# Patient Record
Sex: MALE | Race: WHITE
[De-identification: names, ages, dates, MRNs, and addresses within clinical notes are randomized per-mention and may not be internally consistent; named-entity substitution may affect disease eponyms.]

---

## 2018-10-23 ENCOUNTER — HOSPITAL ENCOUNTER (EMERGENCY)
Dept: HOSPITAL 31 - C.ER | Age: 43
Discharge: HOME | End: 2018-10-23
Payer: COMMERCIAL

## 2018-10-23 VITALS — OXYGEN SATURATION: 99 %

## 2018-10-23 VITALS — TEMPERATURE: 98.5 F | SYSTOLIC BLOOD PRESSURE: 153 MMHG | DIASTOLIC BLOOD PRESSURE: 89 MMHG

## 2018-10-23 VITALS — HEART RATE: 88 BPM | RESPIRATION RATE: 18 BRPM

## 2018-10-23 DIAGNOSIS — J06.9: Primary | ICD-10-CM

## 2018-10-23 NOTE — C.PDOC
History Of Present Illness


42 year old male presents to the ER with a complaint of a cough productive of 

clear sputum for one week associated with subjective fever and itchy throat. 

Patient took OTC cough and congestion medication with no relief. Patient states 

he has been unable to seen his PMD as they are on vacation, prompting visit. 

Denies SOB or chest tightness.


Time Seen by Provider: 10/23/18 02:43


Chief Complaint (Nursing): Cough, Cold, Congestion


History Per: Patient


History/Exam Limitations: no limitations


Onset/Duration Of Symptoms: Days


Current Symptoms Are (Timing): Still Present


Sick Contacts (Context): None


Associated Symptoms: Fever (subjective), Cough, Sputum (clear).  denies: Other 

(Itchy throat. No SOB or chest tightness)


Ear Symptoms: Bilateral: None


Recent travel outside of the [place]: No





Past Medical History


Reviewed: Historical Data, Nursing Documentation, Vital Signs


Vital Signs: 





                                Last Vital Signs











Temp  98.5 F   10/23/18 02:40


 


Pulse  74   10/23/18 02:40


 


Resp  20   10/23/18 02:40


 


BP  153/89 H  10/23/18 02:40


 


Pulse Ox  99   10/23/18 02:40











Family History: States: Unknown Family Hx





- Social History


Hx Alcohol Use: No


Hx Substance Use: No





- Immunization History


Hx Tetanus Toxoid Vaccination: No


Hx Influenza Vaccination: No


Hx Pneumococcal Vaccination: No





Review Of Systems


Constitutional: Positive for: Fever (Subjective).  Negative for: Chills


ENT: Positive for: Other (Itchy throat)


Respiratory: Positive for: Cough, Sputum (Clear).  Negative for: Shortness of 

Breath, Other (Chest tightness)





Physical Exam





- Physical Exam


Appears: Non-toxic


Skin: Normal Color, Warm, Dry


Head: Atraumatic, Normacephalic


Eye(s): bilateral: Normal Inspection


Ear(s): Bilateral: Normal


Nose: Normal


Oral Mucosa: Moist


Throat: Normal, No Erythema, No Exudate


Neck: Normal, Supple


Chest: Symmetrical, No Tenderness


Cardiovascular: Rhythm Regular


Respiratory: Normal Breath Sounds, No Accessory Muscle Use, No Rales, No 

Rhonchi, No Wheezing


Neurological/Psych: Oriented x3, Normal Speech





ED Course And Treatment


O2 Sat by Pulse Oximetry: 99 (Room air)


Pulse Ox Interpretation: Normal


Progress Note: Patient with 99% o2 sat at room air with clear breath sounds, 

resting comfortably in no acute respiratory distress, vitals are stable, will 

discharge home with Rx and instructions to follow up with PMD.





Disposition





- Disposition


Referrals: 


Erica May MD [Primary Care Provider] - 


Disposition: HOME/ ROUTINE


Disposition Time: 03:03


Condition: STABLE


Additional Instructions: 


Please follow up with PMD





Increase PO fluids





Take medicine as directed





Return to ER if worse 


Prescriptions: 


Benzonatate [Tessalon Perles] 100 mg PO TID #20 sgl


Cetirizine HCl [Zyrtec] 10 mg PO DAILY #14 capsule


Instructions:  Upper Respiratory Infection (ED)


Forms:  [facility] (English)





- Clinical Impression


Clinical Impression: 


 Upper respiratory infection








- PA / NP / Resident Statement


MD/DO has reviewed & agrees with the documentation as recorded.





- Scribe Statement


The provider has reviewed the documentation as recorded by the Scribrajni Gómez





All medical record entries made by the Scribe were at my direction and 

personally dictated by me. I have reviewed the chart and agree that the record 

accurately reflects my personal performance of the history, physical exam, 

medical decision making, and the department course for this patient. I have also

personally directed, reviewed, and agree with the discharge instructions and 

disposition.